# Patient Record
Sex: FEMALE | Race: BLACK OR AFRICAN AMERICAN | Employment: FULL TIME | URBAN - METROPOLITAN AREA
[De-identification: names, ages, dates, MRNs, and addresses within clinical notes are randomized per-mention and may not be internally consistent; named-entity substitution may affect disease eponyms.]

---

## 2018-02-01 ENCOUNTER — RADIANT APPOINTMENT (OUTPATIENT)
Dept: GENERAL RADIOLOGY | Facility: CLINIC | Age: 38
End: 2018-02-01
Attending: FAMILY MEDICINE
Payer: COMMERCIAL

## 2018-02-01 ENCOUNTER — OFFICE VISIT (OUTPATIENT)
Dept: FAMILY MEDICINE | Facility: CLINIC | Age: 38
End: 2018-02-01
Payer: COMMERCIAL

## 2018-02-01 VITALS
HEART RATE: 98 BPM | TEMPERATURE: 101 F | HEIGHT: 69 IN | BODY MASS INDEX: 19.26 KG/M2 | DIASTOLIC BLOOD PRESSURE: 69 MMHG | SYSTOLIC BLOOD PRESSURE: 107 MMHG | WEIGHT: 130 LBS | OXYGEN SATURATION: 97 %

## 2018-02-01 DIAGNOSIS — J11.1 INFLUENZA-LIKE ILLNESS: ICD-10-CM

## 2018-02-01 DIAGNOSIS — J11.1 INFLUENZA: Primary | ICD-10-CM

## 2018-02-01 LAB
FLUAV+FLUBV AG SPEC QL: NEGATIVE
FLUAV+FLUBV AG SPEC QL: NEGATIVE
SPECIMEN SOURCE: NORMAL

## 2018-02-01 PROCEDURE — 87804 INFLUENZA ASSAY W/OPTIC: CPT | Performed by: FAMILY MEDICINE

## 2018-02-01 PROCEDURE — 99203 OFFICE O/P NEW LOW 30 MIN: CPT | Performed by: FAMILY MEDICINE

## 2018-02-01 PROCEDURE — 71046 X-RAY EXAM CHEST 2 VIEWS: CPT | Mod: FY

## 2018-02-01 ASSESSMENT — PAIN SCALES - GENERAL: PAINLEVEL: NO PAIN (0)

## 2018-02-01 NOTE — MR AVS SNAPSHOT
After Visit Summary   2/1/2018    Jaden Ascencio    MRN: [id number]           Patient Information     Date Of Birth          1980        Visit Information        Provider Department      2/1/2018 3:40 PM Kaden Valverde MD [facility]        Today's Diagnoses     Influenza    -  1      Care Instructions    At [facility], we strive to deliver an exceptional experience to you, every time we see you.  If you receive a survey in the mail, please send us back your thoughts. We really do value your feedback.    Based on your medical history, these are the current health maintenance/preventive care services that you are due for (some may have been done at this visit.)  Health Maintenance Due   Topic Date Due     PAP SCREENING Q3 YR (SYSTEM ASSIGNED)  05/30/2000     INFLUENZA VACCINE (SYSTEM ASSIGNED)  09/01/2017         Suggested websites for health information:  WwwSnipshot : Up to date and easily searchable information on multiple topics.  Www.TextPayMe.gov : medication info, interactive tutorials, watch real surgeries online  Www.familydoctor.org : good info from the Academy of Family Physicians  Www.cdc.gov : public health info, travel advisories, epidemics (H1N1)  Www.aap.org : children's health info, normal development, vaccinations  Www.health.[facility].[place].us : [place] dept of health, public health issues in [place], N1N1    Your care team:                            Family Medicine Internal Medicine   MD Damir Velazquez MD Shantel Branch-Fleming, MD Katya Georgiev PA-C Megan Hill, APRN CINDA Grissom MD Pediatrics   KAITLIN Villaseñor, CINDA Boyd APRN MD Monse Doss MD Deborah Mielke, MD Kim Thein, APRN CNP      Clinic hours: Monday - Thursday 7 am-7 pm; Fridays 7 am-5 pm.   Urgent care: Monday - Friday 11 am-9 pm; Saturday and Sunday 9 am-5 pm.  Pharmacy : Monday -Thursday 8 am-8 pm;  Performed Bear River Valley Turner Barroso CMA ([facility]) at 1:59 PM on 8/22/2017      "Friday 8 am-6 pm; Saturday and  9 am-5 pm.     Clinic: [phone]   Pharmacy: [phone]              Follow-ups after your visit        Follow-up notes from your care team     Return in about 7 days (around 2018) for recheck if symptoms fail to resolve by then.      Who to contact     If you have questions or need follow up information about today's clinic visit or your schedule please contact [facility] directly at [phone].  Normal or non-critical lab and imaging results will be communicated to you by ROOOMERShart, letter or phone within 4 business days after the clinic has received the results. If you do not hear from us within 7 days, please contact the clinic through ROOOMERShart or phone. If you have a critical or abnormal lab result, we will notify you by phone as soon as possible.  Submit refill requests through Notehall or call your pharmacy and they will forward the refill request to us. Please allow 3 business days for your refill to be completed.          Additional Information About Your Visit        MyChart Information     Notehall lets you send messages to your doctor, view your test results, renew your prescriptions, schedule appointments and more. To sign up, go to [email or web address] . Click on \"Log in\" on the left side of the screen, which will take you to the Welcome page. Then click on \"Sign up Now\" on the right side of the page.     You will be asked to enter the access code listed below, as well as some personal information. Please follow the directions to create your username and password.     Your access code is: [email or web address]  Expires: 2018  4:57 PM     Your access code will  in 90 days. If you need help or a new code, please call your [facility] clinic or [phone].        Care EveryWhere ID     This is your Care EveryWhere ID. This could be used by other organizations to access your [facility] medical records  [id number]        Your Vitals Were " "    Pulse Temperature Height Pulse Oximetry BMI (Body Mass Index)       98 101  F (38.3  C) (Oral) 1.753 m (5' 9\") 97% 19.2 kg/m2        Blood Pressure from Last 3 Encounters:   02/01/18 107/69    Weight from Last 3 Encounters:   02/01/18 59 kg (130 lb)              We Performed the Following     Influenza A/B antigen        Primary Care Provider Fax #    Miriam Blancaa [phone]       [address] [place] [address] Attn: [facility] Dept  [facility] [address]        Equal Access to Services     HEMANT PHILLIPS : Hadii aad ku hadasho Soomaali, waaxda luqadaha, qaybta kaalmada adeegyada, shola hackett haycolleen kinsey . So [facility] [phone].    ATENCIÓN: Si habla español, tiene a pride disposición servicios gratuitos de asistencia lingüística. [facility] [phone].    We comply with applicable federal civil rights laws and [place] laws. We do not discriminate on the basis of race, color, national origin, age, disability, sex, sexual orientation, or gender identity.            Thank you!     Thank you for choosing [facility]  for your care. Our goal is always to provide you with excellent care. Hearing back from our patients is one way we can continue to improve our services. Please take a few minutes to complete the written survey that you may receive in the mail after your visit with us. Thank you!             Your Updated Medication List - Protect others around you: Learn how to safely use, store and throw away your medicines at www.disposemymeds.org.      Notice  As of 2/1/2018  4:57 PM    You have not been prescribed any medications.      "

## 2018-02-01 NOTE — LETTER
[address] [address]  Phone: [phone]    February 1, 2018        Jaden Ascencio  [address]  [place] [address]          To whom it may concern:    RE: Jaden Ascencio    Patient was seen and treated today at our clinic and missed work due to illness. She is diagnosed with influenza and is considered to be contagious through 2/4/18.  Patient may return to work 2/5/18.    Please contact me for questions or concerns.      Sincerely,        Kaden Valverde MD

## 2018-02-01 NOTE — PATIENT INSTRUCTIONS
At [facility], we strive to deliver an exceptional experience to you, every time we see you.  If you receive a survey in the mail, please send us back your thoughts. We really do value your feedback.    Based on your medical history, these are the current health maintenance/preventive care services that you are due for (some may have been done at this visit.)  Health Maintenance Due   Topic Date Due     PAP SCREENING Q3 YR (SYSTEM ASSIGNED)  05/30/2000     INFLUENZA VACCINE (SYSTEM ASSIGNED)  09/01/2017         Suggested websites for health information:  Www.[facility].org : Up to date and easily searchable information on multiple topics.  Www.FatSkunk.gov : medication info, interactive tutorials, watch real surgeries online  Www.familydoctor.org : good info from the Academy of Family Physicians  Www.cdc.gov : public health info, travel advisories, epidemics (H1N1)  Www.aap.org : children's health info, normal development, vaccinations  Www.health.[facility].[place].us : [place] dept of health, public health issues in [place], N1N1    Your care team:                            Family Medicine Internal Medicine   MD Damir Velazquez MD Shantel Branch-Fleming, MD Katya Georgiev PA-C Megan Hill, APRN CNP    Alex Grissom MD Pediatrics   Odell Christianson PAALAN Sheikh, CINDA Boyd APRN CNP   MD Monse Clement MD Deborah Mielke, MD Kim Thein, APRN [facility]      Clinic hours: Monday - Thursday 7 am-7 pm; Fridays 7 am-5 pm.   Urgent care: Monday - Friday 11 am-9 pm; Saturday and Sunday 9 am-5 pm.  Pharmacy : Monday -Thursday 8 am-8 pm; Friday 8 am-6 pm; Saturday and Sunday 9 am-5 pm.     Clinic: [phone]   Pharmacy: [phone]

## 2018-02-01 NOTE — PROGRESS NOTES
"  SUBJECTIVE:   Jaden Ascencio is a 37 year old female who presents to clinic today for the following health issues:      ENT Symptoms             Symptoms: cc Present Absent Comment   Fever/Chills  x  Chills, worst symptom   Fatigue  x  Worst symptom   Muscle Aches  x  Worst symptom   Eye Irritation   x    Sneezing  x     Nasal Tc/Drg  x     Sinus Pressure/Pain  x     Loss of smell  x     Dental pain   x    Sore Throat   x    Swollen Glands  x     Ear Pain/Fullness   x    Cough  x  Chest congestion first symptom   Wheeze   x    Chest Pain  x     Shortness of breath   x    Rash   x    Other         Symptom duration:  about 3-4 days   Symptom severity:  moderate   Treatments tried:  Tylenol   Contacts:  daughter       Medications updated and reviewed.  Past, family and surgical history is updated and reviewed in the record.    ROS:  Other than noted above, general, HEENT, respiratory, cardiac and gastrointestinal systems are negative.    This document serves as a record of the services and decisions personally performed and made by Dr. Valverde. It was created on his behalf by Donna Kaur, a trained medical scribe. The creation of this document is based the provider's statements to the medical scribe.  Donna Kaur February 1, 2018 4:09 PM     OBJECTIVE:                                                    /69 (BP Location: Left arm, Patient Position: Chair, Cuff Size: Adult Regular)  Pulse 98  Temp 101  F (38.3  C) (Oral)  Ht 1.753 m (5' 9\")  Wt 59 kg (130 lb)  SpO2 97%  BMI 19.2 kg/m2   Body mass index is 19.2 kg/(m^2).     GENERAL APPEARANCE ADULT: Alert, no acute distress  EYES: PERRL, EOM normal, conjunctiva and lids normal  HENT: right TM normal, left TM normal, nose clear rhinorrhea, throat/mouth:normal, mucous membranes moist  RESP: lungs clear to auscultation   CV: normal rate, regular rhythm, no murmur or gallop  MS: extremities normal, no peripheral edema  SKIN: no suspicious lesions or " rashes  NEURO: Alert, oriented, speech and mentation normal, Cranial nerves 2-12 are normal.  PSYCH: mentation appears normal., affect and mood normal    Diagnostic Test Results:  Results for orders placed or performed in visit on 02/01/18 (from the past 24 hour(s))   Influenza A/B antigen   Result Value Ref Range    Influenza A/B Agn Specimen Nasal     Influenza A Negative NEG^Negative    Influenza B Negative NEG^Negative     A Chest X-Ray was ordered. My reading of this film is negative. (No comparison films available: pending review by Radiologist.)      ASSESSMENT/PLAN:                                                      (J11.1) Influenza  (primary encounter diagnosis)  Comment: Classic presentation of influenza during a current influenza A epidemic. She probably has influenza A which is not detected by our test which has about a 75% sensitivity.   Plan: Influenza A/B antigen, XR Chest 2 Views        Please see the work note filed in the Letters section. Return in about 7 days (around 2/8/2018) for recheck if symptoms fail to resolve by then.       The information in this document, created by the medical scribe for me, accurately reflects the services I personally performed and the decisions made by me. I have reviewed and approved this document for accuracy prior to leaving the patient care area. February 1, 2018 4:09 PM   Kaden Valverde MD

## 2018-03-20 ENCOUNTER — TELEPHONE (OUTPATIENT)
Dept: FAMILY MEDICINE | Facility: CLINIC | Age: 38
End: 2018-03-20

## 2018-03-20 NOTE — TELEPHONE ENCOUNTER
Panel Management Review        Patient is due/failing the following:   PAP and PHYSICAL    Action needed:   Patient needs office visit for Physical.    Type of outreach:    Sent letter.    Questions for provider review:    None                                                                                   ERIK Flynn

## 2018-03-20 NOTE — LETTER
March 20, 2018      Jaden Ascencio  [address]  [place] [address]          Dear Jaden,    In order to ensure we are providing the best quality care, we have reviewed your chart and see that you are due for:    -Physical with pap smear: Pap smear is a screening test used to detect cervical cancer. It is recommended every three years for women 21 and older. The test should be done at least once every three years but women who are at greater risk for cervical cancer may need to have the test more often.    Please call the clinic at your earliest convenience to schedule an appointment. If you have had any of the screenings listed above at another facility, please call us so that we may update your chart.      Thank you for trusting us with your health care.    Sincerely,    [facility]/ [phone]  [phone]

## 2020-06-04 ENCOUNTER — ANCILLARY PROCEDURE (OUTPATIENT)
Dept: GENERAL RADIOLOGY | Facility: CLINIC | Age: 40
End: 2020-06-04
Attending: NURSE PRACTITIONER
Payer: COMMERCIAL

## 2020-06-04 ENCOUNTER — OFFICE VISIT (OUTPATIENT)
Dept: URGENT CARE | Facility: URGENT CARE | Age: 40
End: 2020-06-04
Payer: COMMERCIAL

## 2020-06-04 VITALS
WEIGHT: 160 LBS | TEMPERATURE: 99.5 F | DIASTOLIC BLOOD PRESSURE: 70 MMHG | HEART RATE: 107 BPM | BODY MASS INDEX: 23.63 KG/M2 | RESPIRATION RATE: 20 BRPM | SYSTOLIC BLOOD PRESSURE: 103 MMHG | OXYGEN SATURATION: 97 %

## 2020-06-04 DIAGNOSIS — M25.572 ACUTE LEFT ANKLE PAIN: ICD-10-CM

## 2020-06-04 DIAGNOSIS — S93.402A SPRAIN OF LEFT ANKLE, UNSPECIFIED LIGAMENT, INITIAL ENCOUNTER: Primary | ICD-10-CM

## 2020-06-04 PROCEDURE — 99214 OFFICE O/P EST MOD 30 MIN: CPT | Performed by: NURSE PRACTITIONER

## 2020-06-04 PROCEDURE — 73610 X-RAY EXAM OF ANKLE: CPT | Mod: LT

## 2020-06-04 RX ORDER — ACETAMINOPHEN 500 MG
1000 TABLET ORAL ONCE
Status: COMPLETED | OUTPATIENT
Start: 2020-06-04 | End: 2020-06-04

## 2020-06-04 RX ADMIN — Medication 1000 MG: at 19:26

## 2020-06-04 ASSESSMENT — PAIN SCALES - GENERAL: PAINLEVEL: NO PAIN (0)

## 2020-06-04 NOTE — LETTER
June 5, 2020      [facility] LUCY Ascencio  [address]  [place] [address]        Dear ,    We are writing to inform you of your recent result for your x rays.   The Radiologist felt your x rays were normal. Enclosed is a copy of these report.    If you have any questions or concerns, please call the clinic at the number listed above.   Thank you for choosing [facility].        Sincerely,      Eloina Houston CNP    Resulted Orders   XR Ankle Left G/E 3 Views    Narrative    LEFT ANKLE THREE VIEWS  6/4/2020 7:43 PM    HISTORY: Acute left ankle pain.    COMPARISON: None.    FINDINGS: No fracture or osseous lesion is seen. The joint spaces are  well preserved. No soft tissue pathology is seen.      Impression    IMPRESSION: Unremarkable examination.    RAQUEL AMBROSIO MD

## 2020-06-05 NOTE — PROGRESS NOTES
SUBJECTIVE:   Jaden Ascencio is a 40 year old female presenting with a complaint of pain in the left ankle since missing a step and rolling the ankle laterally earlier today.  She denies any previous injury or surgery to this ankle.  She is able to bear weight but it does cause pain    Onset of symptoms was 3 hour(s) ago.  Course of illness is sudden onset.    Severity moderate  Previous Treatment none      History reviewed. No pertinent past medical history.  No current outpatient medications on file.     Social History     Tobacco Use     Smoking status: Former Smoker     Smokeless tobacco: Never Used   Substance Use Topics     Alcohol use: No     History reviewed. No pertinent family history.     ROS: 7 point ROS neg other than the symptoms noted above in the HPI.     OBJECTIVE  /70 (BP Location: Left arm, Patient Position: Sitting, Cuff Size: Adult Regular)   Pulse 107   Temp 99.5  F (37.5  C) (Tympanic)   Resp 20   Wt 72.6 kg (160 lb)   LMP 05/21/2020 (Approximate)   SpO2 97%   BMI 23.63 kg/m        GENERAL APPEARANCE: healthy appearing, alert     MS: extremities normal- no gross deformities noted; normal muscle tone, except for left ankle where she has tenderness over the lateral malleolus as well as significant edema     SKIN: no suspicious lesions or rashes     NEURO: Normal strength and tone, mentation intact and speech normal    X-ray of left ankle shows no fractures or dislocations as read by this provider.    Left ankle wrapped with ACE bandage for comfort.    ASSESSMENT/PLAN:      ICD-10-CM    1. Sprain of left ankle, unspecified ligament, initial encounter  S93.402A    2. Acute left ankle pain  M25.572 XR Ankle Left G/E 3 Views     acetaminophen (TYLENOL) tablet 1,000 mg     VACCINE ADMINISTRATION, NASAL/ORAL        Follow Up:      Patient Instructions     Rest the affected painful area as much as possible.  Apply ice for 15-20 minutes intermittently as needed and especially after any  offending activity. Daily stretching.  As pain recedes, begin normal activities slowly as tolerated.     Acetaminophen (Tylenol) may be taken up to 4000mg daily (3000mg if over 65) which would be 2 regular strength tablets (325mg) or two extra strength tablets(500mg) up to 4 times a day (3 times a day if over 65).   Check for acetaminophen in other OTC or prescription medications and be sure you add this in the maximum amount you take every day.    Too much acetaminophen can lead to serious liver damage. DO NOT TAKE if you have a history of liver disease.    Ibuprofen 200mg tablets may be taken up to 4 pills 4 times a day(three times a day if over 65)  to the maximum of 3200mg,  (2400mg if >65)  daily as needed for pain.   Take with food.  Don't take with aspirin, Aleve or other antiinflammatory medication or with warfarin. DO NOT TAKE if you have a history of kidney disease.    Patient Education     Understanding Ankle Sprain    The ankle is the joint where the leg and foot meet. Bones are held in place by connective tissue called ligaments. When ankle ligaments are stretched to the point of pain and injury, it is called an ankle sprain. A sprain can tear the ligaments. These tears can be very small but still cause pain. Ankle sprains can be mild or severe.  What causes an ankle sprain?  A sprain may occur when you twist your ankle or bend it too far. This can happen when you stumble or fall. Things that can make an ankle sprain more likely include:    Having had an ankle sprain before    Playing sports that involve running and jumping. Or playing contact sports such as football or hockey.    Wearing shoes that don t support your feet and ankles well    Having ankles with poor strength and flexibility  Symptoms of an ankle sprain  Symptoms may include:    Pain or soreness in the ankle    Swelling    Redness or bruising    Not being able to walk or put weight on the affected foot    Reduced range of motion in the  ankle    A popping or tearing feeling at the time the sprain occurs    An abnormal or dislocated look to the ankle    Instability or too much range of motion in the ankle  Treatment for an ankle sprain  Treatment focuses on reducing pain and swelling, and avoiding further injury. Treatments may include:    Resting the ankle. Avoid putting weight on it. This may mean using crutches until the sprain heals.    Prescription or over-the-counter pain medicines. These help reduce swelling and pain.    Cold packs. These help reduce pain and swelling.    Raising your ankle above your heart. This helps reduce swelling.    Wrapping the ankle with an elastic bandage or ankle brace. This helps reduce swelling and gives some support to the ankle. In rare cases, you may need a cast or boot.    Stretching and other exercises. These improve flexibility and strength.    Heat packs. These may be recommended before doing ankle exercises.  Possible complications of an ankle sprain  An ankle that has been weakened by a sprain can be more likely to have repeated sprains afterward. Doing exercises to strengthen your ankle and improve balance can reduce your risk for repeated sprains. Other possible complications are long-term (chronic) pain or an ankle that remains unstable.  When to call your healthcare provider  Call your healthcare provider right away if you have any of these:    Fever of 100.4 F (38 C) or higher, or as directed    Pain, numbness, discoloration, or coldness in the foot or toes    Pain that gets worse    Symptoms that don t get better, or get worse    New symptoms   Date Last Reviewed: 3/10/2016    [id number] [employer]. [address] [place] [address], [place] [address]. All rights reserved. This information is not intended as a substitute for professional medical care. Always follow your healthcare professional's instructions.               SOPHIA Martinez, CNP  [facility] Provider

## 2020-06-05 NOTE — PATIENT INSTRUCTIONS
Rest the affected painful area as much as possible.  Apply ice for 15-20 minutes intermittently as needed and especially after any offending activity. Daily stretching.  As pain recedes, begin normal activities slowly as tolerated.     Acetaminophen (Tylenol) may be taken up to 4000mg daily (3000mg if over 65) which would be 2 regular strength tablets (325mg) or two extra strength tablets(500mg) up to 4 times a day (3 times a day if over 65).   Check for acetaminophen in other OTC or prescription medications and be sure you add this in the maximum amount you take every day.    Too much acetaminophen can lead to serious liver damage. DO NOT TAKE if you have a history of liver disease.    Ibuprofen 200mg tablets may be taken up to 4 pills 4 times a day(three times a day if over 65)  to the maximum of 3200mg,  (2400mg if >65)  daily as needed for pain.   Take with food.  Don't take with aspirin, Aleve or other antiinflammatory medication or with warfarin. DO NOT TAKE if you have a history of kidney disease.    Patient Education     Understanding Ankle Sprain    The ankle is the joint where the leg and foot meet. Bones are held in place by connective tissue called ligaments. When ankle ligaments are stretched to the point of pain and injury, it is called an ankle sprain. A sprain can tear the ligaments. These tears can be very small but still cause pain. Ankle sprains can be mild or severe.  What causes an ankle sprain?  A sprain may occur when you twist your ankle or bend it too far. This can happen when you stumble or fall. Things that can make an ankle sprain more likely include:    Having had an ankle sprain before    Playing sports that involve running and jumping. Or playing contact sports such as football or hockey.    Wearing shoes that don t support your feet and ankles well    Having ankles with poor strength and flexibility  Symptoms of an ankle sprain  Symptoms may include:    Pain or soreness in the  ankle    Swelling    Redness or bruising    Not being able to walk or put weight on the affected foot    Reduced range of motion in the ankle    A popping or tearing feeling at the time the sprain occurs    An abnormal or dislocated look to the ankle    Instability or too much range of motion in the ankle  Treatment for an ankle sprain  Treatment focuses on reducing pain and swelling, and avoiding further injury. Treatments may include:    Resting the ankle. Avoid putting weight on it. This may mean using crutches until the sprain heals.    Prescription or over-the-counter pain medicines. These help reduce swelling and pain.    Cold packs. These help reduce pain and swelling.    Raising your ankle above your heart. This helps reduce swelling.    Wrapping the ankle with an elastic bandage or ankle brace. This helps reduce swelling and gives some support to the ankle. In rare cases, you may need a cast or boot.    Stretching and other exercises. These improve flexibility and strength.    Heat packs. These may be recommended before doing ankle exercises.  Possible complications of an ankle sprain  An ankle that has been weakened by a sprain can be more likely to have repeated sprains afterward. Doing exercises to strengthen your ankle and improve balance can reduce your risk for repeated sprains. Other possible complications are long-term (chronic) pain or an ankle that remains unstable.  When to call your healthcare provider  Call your healthcare provider right away if you have any of these:    Fever of 100.4 F (38 C) or higher, or as directed    Pain, numbness, discoloration, or coldness in the foot or toes    Pain that gets worse    Symptoms that don t get better, or get worse    New symptoms   Date Last Reviewed: 3/10/2016    [id number] [employer]. [address] [place] [address], [place] [address]. All rights reserved. This information is not intended as a substitute for professional medical care. Always  follow your healthcare professional's instructions.

## 2020-06-05 NOTE — NURSING NOTE
Clinic Administered Medication Documentation    Oral Medication Documentation    Patient was given Acetaminophen. Prior to medication administration, verified patients identity using patient s name and date of birth. Please see MAR and medication order for additional information.     Was entire amount of medication used? Yes  Expiration Date: 11/21    The following medication was given:     MEDICATION: Tylenol Extra Strength  ROUTE: PO  SITE: mouth  DOSE: 1000 mg  LOT #: [id number]  :  Ed[employer]  EXPIRATION DATE:  11/21  NDC#: 19823-773-34    Nick Norman CMA